# Patient Record
Sex: MALE | Race: WHITE | NOT HISPANIC OR LATINO | Employment: OTHER | URBAN - METROPOLITAN AREA
[De-identification: names, ages, dates, MRNs, and addresses within clinical notes are randomized per-mention and may not be internally consistent; named-entity substitution may affect disease eponyms.]

---

## 2017-01-02 ENCOUNTER — APPOINTMENT (OUTPATIENT)
Dept: GENERAL RADIOLOGY | Facility: CLINIC | Age: 67
End: 2017-01-02
Attending: NURSE PRACTITIONER
Payer: COMMERCIAL

## 2017-01-02 ENCOUNTER — HOSPITAL ENCOUNTER (EMERGENCY)
Facility: CLINIC | Age: 67
Discharge: HOME | End: 2017-01-02
Attending: NURSE PRACTITIONER | Admitting: NURSE PRACTITIONER
Payer: COMMERCIAL

## 2017-01-02 VITALS — OXYGEN SATURATION: 96 % | SYSTOLIC BLOOD PRESSURE: 171 MMHG | DIASTOLIC BLOOD PRESSURE: 96 MMHG | TEMPERATURE: 97 F

## 2017-01-02 DIAGNOSIS — R07.81 RIB PAIN ON LEFT SIDE: ICD-10-CM

## 2017-01-02 PROCEDURE — 99203 OFFICE O/P NEW LOW 30 MIN: CPT | Performed by: NURSE PRACTITIONER

## 2017-01-02 PROCEDURE — 71101 X-RAY EXAM UNILAT RIBS/CHEST: CPT | Mod: LT

## 2017-01-02 PROCEDURE — 99213 OFFICE O/P EST LOW 20 MIN: CPT

## 2017-01-02 RX ORDER — HYDROCODONE BITARTRATE AND ACETAMINOPHEN 5; 325 MG/1; MG/1
1-2 TABLET ORAL EVERY 4 HOURS PRN
Qty: 15 TABLET | Refills: 0 | Status: SHIPPED | OUTPATIENT
Start: 2017-01-02 | End: 2017-01-17

## 2017-01-02 RX ORDER — METHOCARBAMOL 750 MG/1
750 TABLET, FILM COATED ORAL 4 TIMES DAILY PRN
Qty: 45 TABLET | Refills: 0 | Status: SHIPPED | OUTPATIENT
Start: 2017-01-02 | End: 2017-01-17

## 2017-01-02 NOTE — ED PROVIDER NOTES
History     Chief Complaint   Patient presents with     Rib Injury     fall on ice     HPI  Charlie Duckworth is a 66 year old male who Fell on ice Saturday, carrying something. Landed on his back. Did not hit head. Left rib/back pain. Denies shortness of breath.  Pain increases with movement.     Patient Active Problem List   Diagnosis     Essential hypertension, benign     Hyperlipidemia       No current facility-administered medications on file prior to encounter.  Current Outpatient Prescriptions on File Prior to Encounter:  MULTIVITAMINS OR TABS ONEtablet twice daily [facility]   OVER-THE-COUNTER Anti oxident for eye 1 tablet twice daily       I have reviewed the Medications, Allergies, Past Medical and Surgical History, and Social History in the Epic system.    Review of Systems  As mentioned above in the history present illness. All other systems were reviewed and are negative.    Physical Exam   BP: (!) 171/96 mmHg  Heart Rate: 74  Temp: 97  F (36.1  C)  SpO2: 96 %  Physical Exam    GENERAL APPEARANCE: healthy, alert and no distress  EYES: EOMI,  PERRL, conjunctiva clear  HENT: ear canals and TM's normal.  Nose and mouth without ulcers, erythema or lesions  NECK: supple, nontender, no lymphadenopathy  RESP: lungs clear to auscultation - no rales, rhonchi or wheezes  CV: regular rates and rhythm, normal S1 S2, no murmur noted  Chest Wall: tender with palpation to the left back low ribs. No crepitus.  No ecchymosis or swelling.       ED Course   Procedures         Results for orders placed or performed during the hospital encounter of 01/02/17 (from the past 24 hour(s))   Ribs XR, unilat 3 views + PA chest,  left    Narrative    XR RIBS & CHEST LT 3VW 1/2/2017 12:46 PM    COMPARISON: None.    HISTORY: Fall on ice, left rib and back pain.      Impression    IMPRESSION: Cardiac silhouette and pulmonary vasculature are within  normal limits. No focal airspace disease, pleural effusion or  pneumothorax. No  displaced rib fractures are seen.    URBAN POWERS       Labs Ordered and Resulted from Time of ED Arrival Up to the Time of Departure from the ED - No data to display    Assessments & Plan (with Medical Decision Making)   Left back/rib pain since falling on ice 3 days ago. Exam is unremarkable. Xray negative for rib fracture, pneumothorax or pneumonia.  Likely musculoskeletal pain. Provided pain medicaiton and muscle relaxer- Norco and Robaxin sent to pharmacy. Instructed as follows:  Heat to back as needed.  Ice after doing activity.  Use ibuprofen 400-600 mg up to 4 times per day if needed for pain. Stop if it is causing nausea or abdominal pain.   Add Norco 5-325 (hydrocodone-acetaminophen) 1-2 pills up to every 4 hours if needed for pain. Do not use alcohol, operate machinery, drive, or climb on ladders for 8 hours after taking Norco. Use docusate (100mg) 2 times a day to prevent constipation while on narcotics.  Robaxyn 750mg every 4x/day as needed for spasm.    I have reviewed the nursing notes.    I have reviewed the findings, diagnosis, plan and need for follow up with the patient.    Discharge Medication List as of 1/2/2017  1:13 PM      START taking these medications    Details   HYDROcodone-acetaminophen (NORCO) 5-325 MG per tablet Take 1-2 tablets by mouth every 4 hours as needed for moderate to severe pain, Disp-15 tablet, R-0, Local Print      methocarbamol (ROBAXIN) 750 MG tablet Take 1 tablet (750 mg) by mouth 4 times daily as needed for muscle spasms, Disp-45 tablet, R-0, E-Prescribe             Final diagnoses:   Rib pain on left side       1/2/2017   [facility] EMERGENCY DEPARTMENT      Vanessa De Leon APRN CNP  01/02/17 1313    Vanessa De Leon APRN CNP  01/02/17 4475

## 2017-01-02 NOTE — ED AVS SNAPSHOT
[facility] Emergency Department    [address] [address]    Phone:  [phone]    Fax:  [phone]                                       Charlie Duckworth   MRN: [id number]    Department:  [facility] Emergency Department   Date of Visit:  1/2/2017           Patient Information     Date Of Birth          1950        Your diagnoses for this visit were:     Rib pain on left side        You were seen by Vanessa De Leon APRN CNP.      Follow-up Information     Follow up with Sacha Adari MD.    Specialty:  Family Practice    Why:  As needed    Contact information:    [facility]  [address] [address]  [phone]          Discharge Instructions       Heat to back as needed.  Ice after doing activity.  Use ibuprofen 400-600 mg up to 4 times per day if needed for pain. Stop if it is causing nausea or abdominal pain.   Add Norco 5-325 (hydrocodone-acetaminophen) 1-2 pills up to every 4 hours if needed for pain. Do not use alcohol, operate machinery, drive, or climb on ladders for 8 hours after taking Norco. Use docusate (100mg) 2 times a day to prevent constipation while on narcotics.  Robaxyn 750mg every 4x/day as needed for spasm.      Discharge References/Attachments     CHEST WALL PAIN, COSTOCHONDRITIS (ENGLISH)      24 Hour Appointment Hotline       To make an appointment at any [facility] clinic, call [phone] ([phone]). If you don't have a family doctor or clinic, we will help you find one. [facility] clinics are conveniently located to serve the needs of you and your family.             Review of your medicines      START taking        Dose / Directions Last dose taken    HYDROcodone-acetaminophen 5-325 MG per tablet   Commonly known as:  NORCO   Dose:  1-2 tablet   Quantity:  15 tablet        Take 1-2 tablets by mouth every 4 hours as needed for moderate to severe pain   Refills:  0        methocarbamol 750 MG tablet    Commonly known as:  ROBAXIN   Dose:  750 mg   Quantity:  45 tablet        Take 1 tablet (750 mg) by mouth 4 times daily as needed for muscle spasms   Refills:  0          Our records show that you are taking the medicines listed below. If these are incorrect, please call your family doctor or clinic.        Dose / Directions Last dose taken    multivitamin per tablet        ONEtablet twice daily [facility] man   Refills:  0        OVER-THE-COUNTER        Anti oxident for eye 1 tablet twice daily   Refills:  0                Prescriptions were sent or printed at these locations (2 Prescriptions)                   [facility] [place], [place] - [address]   [address] [address]    Telephone:  [phone]   Fax:  [phone]   Hours:                  E-Prescribed (1 of 2)         methocarbamol (ROBAXIN) 750 MG tablet                 Printed at Department/Unit printer (1 of 2)         HYDROcodone-acetaminophen (NORCO) 5-325 MG per tablet                Procedures and tests performed during your visit     Ribs XR, unilat 3 views + PA chest,  left      Orders Needing Specimen Collection     None      Pending Results     No orders found from 1/1/2017 to 1/3/2017.       Test Results from your hospital stay           1/2/2017 12:51 PM - Interface, Radiant Ib      Narrative     XR RIBS & CHEST LT 3VW 1/2/2017 12:46 PM    COMPARISON: None.    HISTORY: Fall on ice, left rib and back pain.        Impression     IMPRESSION: Cardiac silhouette and pulmonary vasculature are within  normal limits. No focal airspace disease, pleural effusion or  pneumothorax. No displaced rib fractures are seen.    URBAN POWERS                Thank you for choosing [facility]       Thank you for choosing [facility] for your care. Our goal is always to provide you with excellent care. Hearing back from our patients is one way we can continue to improve our services. Please take a few minutes to complete the written  "survey that you may receive in the mail after you visit with us. Thank you!        [facility] Information     [employer] lets you send messages to your doctor, view your test results, renew your prescriptions, schedule appointments and more. To sign up, go to [email or web address] . Click on \"Log in\" on the left side of the screen, which will take you to the Welcome page. Then click on \"Sign up Now\" on the right side of the page.     You will be asked to enter the access code listed below, as well as some personal information. Please follow the directions to create your username and password.     Your access code is: [email or web address]  Expires: 2017  1:13 PM     Your access code will  in 90 days. If you need help or a new code, please call your [place] clinic or [phone].        After Visit Summary       This is your record. Keep this with you and show to your community pharmacist(s) and doctor(s) at your next visit.                  "

## 2017-01-02 NOTE — DISCHARGE INSTRUCTIONS
Heat to back as needed.  Ice after doing activity.  Use ibuprofen 400-600 mg up to 4 times per day if needed for pain. Stop if it is causing nausea or abdominal pain.   Add Norco 5-325 (hydrocodone-acetaminophen) 1-2 pills up to every 4 hours if needed for pain. Do not use alcohol, operate machinery, drive, or climb on ladders for 8 hours after taking Norco. Use docusate (100mg) 2 times a day to prevent constipation while on narcotics.  Robaxyn 750mg every 4x/day as needed for spasm.

## 2017-01-02 NOTE — ED AVS SNAPSHOT
[facility] Emergency Department    [address] [address]    Phone:  [phone]    Fax:  [phone]                                       Charlie Duckworth   MRN: [id number]    Department:  [facility] Emergency Department   Date of Visit:  1/2/2017           After Visit Summary Signature Page     I have received my discharge instructions, and my questions have been answered. I have discussed any challenges I see with this plan with the nurse or doctor.    ..........................................................................................................................................  Patient/Patient Representative Signature      ..........................................................................................................................................  Patient Representative Print Name and Relationship to Patient    ..................................................               ................................................  Date                                            Time    ..........................................................................................................................................  Reviewed by Signature/Title    ...................................................              ..............................................  Date                                                            Time

## 2017-01-17 ENCOUNTER — OFFICE VISIT (OUTPATIENT)
Dept: FAMILY MEDICINE | Facility: CLINIC | Age: 67
End: 2017-01-17
Payer: COMMERCIAL

## 2017-01-17 VITALS
SYSTOLIC BLOOD PRESSURE: 143 MMHG | WEIGHT: 184.8 LBS | TEMPERATURE: 97.8 F | HEART RATE: 66 BPM | HEIGHT: 66 IN | OXYGEN SATURATION: 97 % | BODY MASS INDEX: 29.7 KG/M2 | DIASTOLIC BLOOD PRESSURE: 81 MMHG

## 2017-01-17 DIAGNOSIS — Z23 NEED FOR VACCINATION WITH 13-POLYVALENT PNEUMOCOCCAL CONJUGATE VACCINE: ICD-10-CM

## 2017-01-17 DIAGNOSIS — I10 ESSENTIAL HYPERTENSION, BENIGN: ICD-10-CM

## 2017-01-17 DIAGNOSIS — R07.81 RIB PAIN ON LEFT SIDE: Primary | ICD-10-CM

## 2017-01-17 PROCEDURE — 90471 IMMUNIZATION ADMIN: CPT | Performed by: INTERNAL MEDICINE

## 2017-01-17 PROCEDURE — 99203 OFFICE O/P NEW LOW 30 MIN: CPT | Mod: 25 | Performed by: INTERNAL MEDICINE

## 2017-01-17 PROCEDURE — 90670 PCV13 VACCINE IM: CPT | Performed by: INTERNAL MEDICINE

## 2017-01-17 NOTE — PROGRESS NOTES
"  SUBJECTIVE:                                                    Charlie Duckworth is a 66 year old male who presents to clinic today for the following health issues:      ED/UC Followup:    Facility:  [facility] ED  Date of visit: 1/2/17  Reason for visit: fall on ice, carrying large box on to the ice, injured left side ribs  Current Status: still in pain 3/10, would like another x-ray and check for blood clots.      Charlie fell on the ice on New Year's Nena and presented to the ED on 1/2 with left rib pain.  X-ray was normal.  They gave him Norco and Robaxin for pain, he took these for awhile and got better, and is not longer taking anything for pain.  He does still have a bit of pain in the left posterior rib cage and sometimes in the left lower anterior rib cage.  He wonders if a fracture may have been missed or if he may have a blood clot in the lungs since he does have siblings who have had clots.  He has not had any SOB, fevers, chills, leg swelling, or calf pain.  He did not have any periods of inactivity due to his injury.    Problem list and histories reviewed & adjusted, as indicated.  Additional history: as documented    No current outpatient prescriptions on file.     No Known Allergies    ROS:  Constitutional, MSK systems are negative, except as otherwise noted.    OBJECTIVE:                                                    /81 mmHg  Pulse 66  Temp(Src) 97.8  F (36.6  C) (Tympanic)  Ht 5' 5.75\" (1.67 m)  Wt 184 lb 12.8 oz (83.825 kg)  BMI 30.06 kg/m2  SpO2 97%  Body mass index is 30.06 kg/(m^2).  GENERAL: healthy, alert and no distress  RESP: lungs clear to auscultation - no rales, rhonchi or wheezes  CV: regular rate and rhythm, normal S1 S2, no S3 or S4, no murmur, click or rub  MS: mild tenderness to palpation in the left lower posterior ribs on palpation, no leg edema of calf pain    Diagnostic Test Results:  none      ASSESSMENT/PLAN:                                                  "       1. Rib pain on left side    He has some ongoing pain with mild tenderness on palpation of left lower posterior chest wall on exam.  A small fracture is a possibility, but bruising is much more likely.  Given that an x-ray would not , he is okay with not repeating imaging.  He does not have any other symptoms or signs for PE, and I feel this is very unlikely and testing is not necessary.  He was reassured that he lacks other symptoms- did provide him education of these symptoms to look out for in the future since he may be at higher risk given his family history.      2. Essential hypertension, benign    BP slightly above target today.  He says this is because he has been drinking more alcohol during the holidays and he expects it will come down after the Super Bowl.  He does not wish to start medication.    Follow-up as needed.      Huseyin Mccartney MD  [facility]

## 2017-01-17 NOTE — MR AVS SNAPSHOT
"              After Visit Summary   1/17/2017    Charlie Duckworth    MRN: [id number]           Patient Information     Date Of Birth          1950        Visit Information        Provider Department      1/17/2017 9:00 AM Huseyin Mccartney MD [facility]        Care Instructions    Symptoms of blood clots include shortness of breath, fast heart rate, low grade fever.  If the clot is in the legs, there is usually swelling in the lower legs and pain in the calves.  Clots tend to happen when people are not very active.  Since you are not having these symptoms, a clot is very unlikely and I don't think we need to do any testing.    The prolonged pain in your ribs likely due to some bruising on the bones.          Follow-ups after your visit        Who to contact     If you have questions or need follow up information about today's clinic visit or your schedule please contact [facility] directly at [phone].  Normal or non-critical lab and imaging results will be communicated to you by MyChart, letter or phone within 4 business days after the clinic has received the results. If you do not hear from us within 7 days, please contact the clinic through [employer] or phone. If you have a critical or abnormal lab result, we will notify you by phone as soon as possible.  Submit refill requests through [employer] or call your pharmacy and they will forward the refill request to us. Please allow 3 business days for your refill to be completed.          Additional Information About Your Visit        [employer] Information     [employer] lets you send messages to your doctor, view your test results, renew your prescriptions, schedule appointments and more. To sign up, go to [email or web address] . Click on \"Log in\" on the left side of the screen, which will take you to the Welcome page. Then click on \"Sign up Now\" on the right side of the page.     You will be asked to enter the access code listed below, as " "well as some personal information. Please follow the directions to create your username and password.     Your access code is: [email or web address]  Expires: 2017  1:13 PM     Your access code will  in 90 days. If you need help or a new code, please call your [facility] or [phone].        Care EveryWhere ID     This is your Care EveryWhere ID. This could be used by other organizations to access your [place] medical records  [id number]        Your Vitals Were     Pulse Temperature Height BMI (Body Mass Index) Pulse Oximetry       66 97.8  F (36.6  C) (Tympanic) 5' 5.75\" (1.67 m) 30.06 kg/m2 97%        Blood Pressure from Last 3 Encounters:   17 143/81   17 171/96   02/15/07 138/92    Weight from Last 3 Encounters:   17 184 lb 12.8 oz (83.825 kg)   02/15/07 189 lb 12.8 oz (86.093 kg)   07 194 lb (87.998 kg)              Today, you had the following     No orders found for display       Primary Care Provider Office Phone # Fax #    Sacha Adair [phone]       [facility] [address] [address]        Thank you!     Thank you for choosing [facility]  for your care. Our goal is always to provide you with excellent care. Hearing back from our patients is one way we can continue to improve our services. Please take a few minutes to complete the written survey that you may receive in the mail after your visit with us. Thank you!             Your Updated Medication List - Protect others around you: Learn how to safely use, store and throw away your medicines at www.disposemymeds.org.          This list is accurate as of: 17  9:33 AM.  Always use your most recent med list.                   Brand Name Dispense Instructions for use    HYDROcodone-acetaminophen 5-325 MG per tablet    NORCO    15 tablet    Take 1-2 tablets by mouth every 4 hours as needed for moderate to severe pain       methocarbamol 750 MG tablet "    ROBAXIN    45 tablet    Take 1 tablet (750 mg) by mouth 4 times daily as needed for muscle spasms       multivitamin per tablet      ONEtablet twice daily [facility]       OVER-THE-COUNTER      Anti oxident for eye 1 tablet twice daily

## 2017-01-17 NOTE — NURSING NOTE
"Chief Complaint   Patient presents with     ER F/U     seen 1/2/17, fell on ice, wants x-ray and check for blood clots        Initial /91 mmHg  Pulse 66  Temp(Src) 97.8  F (36.6  C) (Tympanic)  Ht 5' 5.75\" (1.67 m)  Wt 184 lb 12.8 oz (83.825 kg)  BMI 30.06 kg/m2  SpO2 97% Estimated body mass index is 30.06 kg/(m^2) as calculated from the following:    Height as of this encounter: 5' 5.75\" (1.67 m).    Weight as of this encounter: 184 lb 12.8 oz (83.825 kg).  BP completed using cuff size: calista MERCADO CMA ([facility])        "

## 2017-01-17 NOTE — PATIENT INSTRUCTIONS
Symptoms of blood clots include shortness of breath, fast heart rate, low grade fever.  If the clot is in the legs, there is usually swelling in the lower legs and pain in the calves.  Clots tend to happen when people are not very active.  Since you are not having these symptoms, a clot is very unlikely and I don't think we need to do any testing.    The prolonged pain in your ribs likely due to some bruising on the bones.

## 2017-06-28 ENCOUNTER — TELEPHONE (OUTPATIENT)
Dept: FAMILY MEDICINE | Facility: CLINIC | Age: 67
End: 2017-06-28

## 2017-06-28 NOTE — TELEPHONE ENCOUNTER
Called patient and left message w/ phone number for patient to return call. When patient calls back, he needs to schedule a colonoscopy and an RN visit to have his blood pressure checked. Cari MERCADO CMA ([facility])

## 2017-07-15 ENCOUNTER — HEALTH MAINTENANCE LETTER (OUTPATIENT)
Age: 67
End: 2017-07-15

## 2018-01-25 ENCOUNTER — TELEPHONE (OUTPATIENT)
Dept: FAMILY MEDICINE | Facility: CLINIC | Age: 68
End: 2018-01-25

## 2018-01-25 NOTE — TELEPHONE ENCOUNTER
1/25/2018    Call Regarding Preventive Health Screening Colonoscopy    Attempt 2    Message on voicemail     Comments: Clinic made prior attempt(s)        Outreach   Bhumika Hedrick

## 2018-02-02 NOTE — TELEPHONE ENCOUNTER
2/2/2018    Call Regarding Preventive Health Screening Colonoscopy    Attempt 3    Message on voicemail    Comments:       Outreach   Heather Chaudhry

## 2018-10-17 ENCOUNTER — TELEPHONE (OUTPATIENT)
Dept: FAMILY MEDICINE | Facility: CLINIC | Age: 68
End: 2018-10-17

## 2018-10-17 NOTE — TELEPHONE ENCOUNTER
"Reason for call:  Patient reporting a symptom    Symptom or request: cough with inspiration, body aches, fever     Duration (how long have symptoms been present): last night    Have you been treated for this before? No    Additional comments: pt calling stating he started getting sick last night. He has a cough and it's hard to take a deep breath. He said he is sore and has \"no drive\".    Phone Number patient can be reached at:  Home number on file [phone] (home)    Best Time:  any    Can we leave a detailed message on this number:  YES    Call taken on 10/17/2018 at 10:18 AM by Duyen Caraballo    "

## 2018-10-18 NOTE — TELEPHONE ENCOUNTER
S-(situation): Flu like symptoms    B-(background): Started yesterday    A-(assessment): Whole body is so sore and stiff. Slept 20 hours yesterday. No energy today. Feels a little better today than yesterday. Does state he thinks he has a fever, although hasn't taken his temp. He states he has chills. Denies chest pain, SOB, black or tarry stools, vomiting, sudden one sided weakness, new onset of irregular or slow heartbeat.     R-(recommendations): As advised by Telephone Triage Protocols for Nurses (Kieran, 2016) eat as tolerated, try toast or soups. Drink fluids. Try ice packs on forehead to reduce fever or heat for sore muscles. Take a bath. Rest.    Marcia BAUMAN RN

## 2018-10-30 ENCOUNTER — OFFICE VISIT (OUTPATIENT)
Dept: FAMILY MEDICINE | Facility: CLINIC | Age: 68
End: 2018-10-30
Payer: MEDICARE

## 2018-10-30 VITALS
SYSTOLIC BLOOD PRESSURE: 152 MMHG | OXYGEN SATURATION: 96 % | DIASTOLIC BLOOD PRESSURE: 80 MMHG | BODY MASS INDEX: 29.89 KG/M2 | HEIGHT: 66 IN | HEART RATE: 59 BPM | TEMPERATURE: 97.1 F | WEIGHT: 186 LBS

## 2018-10-30 DIAGNOSIS — Z00.00 ROUTINE HEALTH MAINTENANCE: Primary | ICD-10-CM

## 2018-10-30 DIAGNOSIS — R20.0 NUMBNESS AND TINGLING OF FOOT: ICD-10-CM

## 2018-10-30 DIAGNOSIS — Z12.5 ENCOUNTER FOR SCREENING FOR MALIGNANT NEOPLASM OF PROSTATE: ICD-10-CM

## 2018-10-30 DIAGNOSIS — R25.2 CRAMP OF LIMB: ICD-10-CM

## 2018-10-30 DIAGNOSIS — Z13.6 SCREENING FOR AAA (ABDOMINAL AORTIC ANEURYSM): ICD-10-CM

## 2018-10-30 DIAGNOSIS — R20.2 NUMBNESS AND TINGLING OF FOOT: ICD-10-CM

## 2018-10-30 DIAGNOSIS — R73.01 ELEVATED FASTING GLUCOSE: ICD-10-CM

## 2018-10-30 DIAGNOSIS — Z11.59 NEED FOR HEPATITIS C SCREENING TEST: ICD-10-CM

## 2018-10-30 DIAGNOSIS — I10 ESSENTIAL HYPERTENSION, BENIGN: ICD-10-CM

## 2018-10-30 DIAGNOSIS — Z23 NEED FOR 23-POLYVALENT PNEUMOCOCCAL POLYSACCHARIDE VACCINE: ICD-10-CM

## 2018-10-30 DIAGNOSIS — D22.9 ATYPICAL MOLE: ICD-10-CM

## 2018-10-30 LAB
ANION GAP SERPL CALCULATED.3IONS-SCNC: 6 MMOL/L (ref 3–14)
BUN SERPL-MCNC: 20 MG/DL (ref 7–30)
CALCIUM SERPL-MCNC: 8.5 MG/DL (ref 8.5–10.1)
CHLORIDE SERPL-SCNC: 105 MMOL/L (ref 94–109)
CHOLEST SERPL-MCNC: 245 MG/DL
CO2 SERPL-SCNC: 29 MMOL/L (ref 20–32)
CREAT SERPL-MCNC: 0.94 MG/DL (ref 0.66–1.25)
GFR SERPL CREATININE-BSD FRML MDRD: 80 ML/MIN/1.7M2
GLUCOSE SERPL-MCNC: 138 MG/DL (ref 70–99)
HDLC SERPL-MCNC: 50 MG/DL
LDLC SERPL CALC-MCNC: 160 MG/DL
MAGNESIUM SERPL-MCNC: 2.3 MG/DL (ref 1.6–2.3)
NONHDLC SERPL-MCNC: 195 MG/DL
POTASSIUM SERPL-SCNC: 4.2 MMOL/L (ref 3.4–5.3)
PSA SERPL-ACNC: 1.54 UG/L (ref 0–4)
SODIUM SERPL-SCNC: 140 MMOL/L (ref 133–144)
TRIGL SERPL-MCNC: 174 MG/DL
TSH SERPL DL<=0.005 MIU/L-ACNC: 2.09 MU/L (ref 0.4–4)
VIT B12 SERPL-MCNC: 406 PG/ML (ref 193–986)

## 2018-10-30 PROCEDURE — 99397 PER PM REEVAL EST PAT 65+ YR: CPT | Mod: 25 | Performed by: INTERNAL MEDICINE

## 2018-10-30 PROCEDURE — G0009 ADMIN PNEUMOCOCCAL VACCINE: HCPCS | Performed by: INTERNAL MEDICINE

## 2018-10-30 PROCEDURE — 80048 BASIC METABOLIC PNL TOTAL CA: CPT | Performed by: INTERNAL MEDICINE

## 2018-10-30 PROCEDURE — 83735 ASSAY OF MAGNESIUM: CPT | Performed by: INTERNAL MEDICINE

## 2018-10-30 PROCEDURE — 84443 ASSAY THYROID STIM HORMONE: CPT | Performed by: INTERNAL MEDICINE

## 2018-10-30 PROCEDURE — 90732 PPSV23 VACC 2 YRS+ SUBQ/IM: CPT | Performed by: INTERNAL MEDICINE

## 2018-10-30 PROCEDURE — 36415 COLL VENOUS BLD VENIPUNCTURE: CPT | Performed by: INTERNAL MEDICINE

## 2018-10-30 PROCEDURE — 80061 LIPID PANEL: CPT | Performed by: INTERNAL MEDICINE

## 2018-10-30 PROCEDURE — 82607 VITAMIN B-12: CPT | Performed by: INTERNAL MEDICINE

## 2018-10-30 PROCEDURE — G0103 PSA SCREENING: HCPCS | Performed by: INTERNAL MEDICINE

## 2018-10-30 PROCEDURE — 86803 HEPATITIS C AB TEST: CPT | Performed by: INTERNAL MEDICINE

## 2018-10-30 PROCEDURE — 99214 OFFICE O/P EST MOD 30 MIN: CPT | Mod: 25 | Performed by: INTERNAL MEDICINE

## 2018-10-30 NOTE — MR AVS SNAPSHOT
After Visit Summary   10/30/2018    Charlie Duckworth    MRN: [id number]           Patient Information     Date Of Birth          1950        Visit Information        Provider Department      10/30/2018 8:20 AM Huseyin Mccartney MD [facility]        Today's Diagnoses     Routine health maintenance    -  1    Need for hepatitis C screening test        Screening for AAA (abdominal aortic aneurysm)        Atypical mole        Numbness and tingling of foot        Cramp of limb        Encounter for screening for malignant neoplasm of prostate           Care Instructions    Call if you need a referral for a new hearing test.    The numbness and burning you describe sounds like a nerve problem, may have a pinched nerve.  We'll check some lab work.  If this is normal and symptoms worsen, we can consider a nerve conduction study.     Return in 2 weeks for a nurse blood pressure check and bring your home readings and your home cuff.     I'll try to order the ultrasound later.  I'll let you know if I get this in.  Please call [phone] to schedule.       Preventive Health Recommendations:       Male Ages 65 and over    Yearly exam:             See your health care provider every year in order to  o   Review health changes.   o   Discuss preventive care.    o   Review your medicines if your doctor has prescribed any.    Talk with your health care provider about whether you should have a test to screen for prostate cancer (PSA).    Every 3 years, have a diabetes test (fasting glucose). If you are at risk for diabetes, you should have this test more often.    Every 5 years, have a cholesterol test. Have this test more often if you are at risk for high cholesterol or heart disease.     Every 10 years, have a colonoscopy. Or, have a yearly FIT test (stool test). These exams will check for colon cancer.    Talk to with your health care provider about screening for Abdominal Aortic Aneurysm if you have a  family history of AAA or have a history of smoking.  Shots:     Get a flu shot each year.     Get a tetanus shot every 10 years.     Talk to your doctor about your pneumonia vaccines. There are now two you should receive - Pneumovax (PPSV 23) and Prevnar (PCV 13).    Talk to your pharmacist about a shingles vaccine.     Talk to your doctor about the hepatitis B vaccine.  Nutrition:     Eat at least 5 servings of fruits and vegetables each day.     Eat whole-grain bread, whole-wheat pasta and brown rice instead of white grains and rice.     Get adequate Calcium and Vitamin D.   Lifestyle    Exercise for at least 150 minutes a week (30 minutes a day, 5 days a week). This will help you control your weight and prevent disease.     Limit alcohol to one drink per day.     No smoking.     Wear sunscreen to prevent skin cancer.     See your dentist every six months for an exam and cleaning.     See your eye doctor every 1 to 2 years to screen for conditions such as glaucoma, macular degeneration and cataracts.          Follow-ups after your visit        Additional Services     DERMATOLOGY REFERRAL       Your provider has referred you to: [facility]: [facility] [phone]   [email or web address]    Please be aware that coverage of these services is subject to the terms and limitations of your health insurance plan.  Call member services at your health plan with any benefit or coverage questions.      Please bring the following with you to your appointment:    (1) Any X-Rays, CTs or MRIs which have been performed.  Contact the facility where they were done to arrange for  prior to your scheduled appointment.    (2) List of current medications  (3) This referral request   (4) Any documents/labs given to you for this referral                  Follow-up notes from your care team     Return in about 2 weeks (around 11/13/2018) for BP Recheck.      Who to contact     If you have  "questions or need follow up information about today's clinic visit or your schedule please contact [facility] directly at [phone].  Normal or non-critical lab and imaging results will be communicated to you by MyChart, letter or phone within 4 business days after the clinic has received the results. If you do not hear from us within 7 days, please contact the clinic through MyChart or phone. If you have a critical or abnormal lab result, we will notify you by phone as soon as possible.  Submit refill requests through [employer] or call your pharmacy and they will forward the refill request to us. Please allow 3 business days for your refill to be completed.          Additional Information About Your Visit        Care EveryWhere ID     This is your Care EveryWhere ID. This could be used by other organizations to access your [place] medical records  [id number]        Your Vitals Were     Pulse Temperature Height Pulse Oximetry BMI (Body Mass Index)       59 97.1  F (36.2  C) (Tympanic) 5' 6.25\" (1.683 m) 96% 29.8 kg/m2        Blood Pressure from Last 3 Encounters:   10/30/18 152/80   01/17/17 143/81   01/02/17 (!) 171/96    Weight from Last 3 Encounters:   10/30/18 186 lb (84.4 kg)   01/17/17 184 lb 12.8 oz (83.8 kg)   02/15/07 189 lb 12.8 oz (86.1 kg)              We Performed the Following     **Hepatitis C Screen Reflex to RNA FUTURE anytime     Basic metabolic panel     DERMATOLOGY REFERRAL     Lipid panel reflex to direct LDL Fasting     Magnesium     PSA, screen     TSH with free T4 reflex     Vitamin B12        Primary Care Provider Office Phone # Fax #    Sacha Adair [phone] [address] [facility] [address]        Equal Access to Services     SHERRI ELKINS : Modesta Wilkinson, tamiko ward, bipin hernandez, perfecto diego. So [facility] [phone].    ATENCIÓN: Si habla español, tiene a li disposición " servicios gratuitos de asistencia lingüística. Jeimy rhodes [phone].    We comply with applicable federal civil rights laws and [place] laws. We do not discriminate on the basis of race, color, national origin, age, disability, sex, sexual orientation, or gender identity.            Thank you!     Thank you for choosing [facility]  for your care. Our goal is always to provide you with excellent care. Hearing back from our patients is one way we can continue to improve our services. Please take a few minutes to complete the written survey that you may receive in the mail after your visit with us. Thank you!             Your Updated Medication List - Protect others around you: Learn how to safely use, store and throw away your medicines at www.disposemymeds.org.      Notice  As of 10/30/2018  9:18 AM    You have not been prescribed any medications.

## 2018-10-30 NOTE — PATIENT INSTRUCTIONS
Call if you need a referral for a new hearing test.    The numbness and burning you describe sounds like a nerve problem, may have a pinched nerve.  We'll check some lab work.  If this is normal and symptoms worsen, we can consider a nerve conduction study.     Return in 2 weeks for a nurse blood pressure check and bring your home readings and your home cuff.     I'll try to order the ultrasound later.  I'll let you know if I get this in.  Please call [phone] to schedule.       Preventive Health Recommendations:       Male Ages 65 and over    Yearly exam:             See your health care provider every year in order to  o   Review health changes.   o   Discuss preventive care.    o   Review your medicines if your doctor has prescribed any.    Talk with your health care provider about whether you should have a test to screen for prostate cancer (PSA).    Every 3 years, have a diabetes test (fasting glucose). If you are at risk for diabetes, you should have this test more often.    Every 5 years, have a cholesterol test. Have this test more often if you are at risk for high cholesterol or heart disease.     Every 10 years, have a colonoscopy. Or, have a yearly FIT test (stool test). These exams will check for colon cancer.    Talk to with your health care provider about screening for Abdominal Aortic Aneurysm if you have a family history of AAA or have a history of smoking.  Shots:     Get a flu shot each year.     Get a tetanus shot every 10 years.     Talk to your doctor about your pneumonia vaccines. There are now two you should receive - Pneumovax (PPSV 23) and Prevnar (PCV 13).    Talk to your pharmacist about a shingles vaccine.     Talk to your doctor about the hepatitis B vaccine.  Nutrition:     Eat at least 5 servings of fruits and vegetables each day.     Eat whole-grain bread, whole-wheat pasta and brown rice instead of white grains and rice.     Get adequate Calcium and Vitamin D.    Lifestyle    Exercise for at least 150 minutes a week (30 minutes a day, 5 days a week). This will help you control your weight and prevent disease.     Limit alcohol to one drink per day.     No smoking.     Wear sunscreen to prevent skin cancer.     See your dentist every six months for an exam and cleaning.     See your eye doctor every 1 to 2 years to screen for conditions such as glaucoma, macular degeneration and cataracts.

## 2018-10-30 NOTE — PROGRESS NOTES
"  SUBJECTIVE:   Charlie Duckworth is a 68 year old male who presents for Preventive Visit.    Chief Complaint   Patient presents with     Physical     Imm/Inj     flu vaccine deferred   Are you in the first 12 months of your Medicare Part B coverage?  No    Healthy Habits:    Do you get at least three servings of calcium containing foods daily (dairy, green leafy vegetables, etc.)? yes    Amount of exercise or daily activities, outside of work: 7 day(s) per week, stretch every morning, treadmill a few times per week, very active     Problems taking medications regularly not applicable    Medication side effects: No    Have you had an eye exam in the past two years? no    Do you see a dentist twice per year? yes    Do you have sleep apnea, excessive snoring or daytime drowsiness?no      Ability to successfully perform activities of daily living: Yes, no assistance needed    Home safety:  none identified     Hearing impairment: Yes, patient reports has trouble hearing all the time, has hearing aids but does not wear them as much as he should    Fall risk:  Fallen 2 or more times in the past year?: No  Any fall with injury in the past year?: No        COGNITIVE SCREEN  1) Repeat 3 items (Leader, Season, Table)    2) Clock draw: NORMAL  3) 3 item recall: Recalls 3 objects  Results: 3 items recalled: COGNITIVE IMPAIRMENT LESS LIKELY    Mini-CogTM Copyright S Katarina. Licensed by the author for use in [facility]; reprinted with permission ([email or web address]). All rights reserved.          CONCERNS:  TOE Numbness - left foot in the 3rd and 4th toes (always numb, but varies in intensity) and some very light \"burning\" sensation on right arm- since early last summer, pain is mild but always there, in a focal spot on the forearm, doesn't go to fingers.  Does have a lot of hand cramping.  No elbow pain.  Does get numb shoulders at night.  No neck or back pain.     Reviewed and updated as needed this visit by clinical " staff  Tobacco  Allergies  Meds  Med Hx  Surg Hx  Fam Hx  Soc Hx        Reviewed and updated as needed this visit by Provider        Social History   Substance Use Topics     Smoking status: Former Smoker     Quit date: 1/31/1972     Smokeless tobacco: Never Used     Alcohol use Yes      Comment: red wine daily       If you drink alcohol do you typically have >3 drinks per day or >7 drinks per week? Yes - AUDIT SCORE:     No flowsheet data found.                        Today's PHQ-2 Score:   PHQ-2 ( 1999 Pfizer) 10/30/2018   Q1: Little interest or pleasure in doing things 0   Q2: Feeling down, depressed or hopeless 1   PHQ-2 Score 1       Do you feel safe in your environment - Yes    Do you have a Health Care Directive?: Yes: Patient states has Advance Directive and will bring in a copy to clinic.    Current providers sharing in care for this patient include:   Patient Care Team:  Sacha Adair MD as PCP - General    The following health maintenance items are reviewed in Epic and correct as of today:  Health Maintenance   Topic Date Due     PHQ-2 Q1 YR  09/29/1963     HEPATITIS C SCREENING  09/29/1969     ADVANCE DIRECTIVE PLANNING Q5 YRS  09/29/2006     PSA Q1 YR  02/01/2008     LIPID MONITORING Q1 YEAR  05/02/2008     AORTIC ANEURYSM SCREENING (SYSTEM ASSIGNED)  09/29/2016     FALL RISK ASSESSMENT  01/17/2018     PNEUMOCOCCAL (2 of 2 - PPSV23) 01/17/2018     INFLUENZA VACCINE (1) 09/01/2018     TETANUS Q10 YR  11/27/2022     COLONOSCOPY Q10 YR  01/01/2024     Patient Active Problem List   Diagnosis     Essential hypertension, benign     Hyperlipidemia     Past Surgical History:   Procedure Laterality Date     SURGICAL HISTORY OF -       rhinoplasty, septoplasty       Social History   Substance Use Topics     Smoking status: Former Smoker     Quit date: 1/31/1972     Smokeless tobacco: Never Used     Alcohol use Yes      Comment: red wine daily     Family History   Problem Relation Age of Onset      "Hypertension Mother      Diabetes Father      Blood Disease Father      leukemia     Alcohol/Drug Father      Alcohol/Drug Maternal Grandmother      Alcohol/Drug Maternal Grandfather      Diabetes Paternal Grandmother      Alcohol/Drug Paternal Grandmother      Alcohol/Drug Paternal Grandfather      Alcohol/Drug Brother      Alcohol/Drug Sister      C.A.D. Son      congential heart  birth defect     Alcohol/Drug Sister      Diabetes Sister          No current outpatient prescriptions on file.     No Known Allergies    Pneumonia Vaccine: due 23 valent    ROS:  Constitutional, HEENT, cardiovascular, pulmonary, GI, , musculoskeletal, neuro, skin, endocrine and psych systems are negative, except as otherwise noted plus a mole on the left upper chest that has been changing plus flu symptoms a couple weeks ago    OBJECTIVE:   /80 (BP Location: Left arm, Patient Position: Chair, Cuff Size: Adult Regular)  Pulse 59  Temp 97.1  F (36.2  C) (Tympanic)  Ht 5' 6.25\" (1.683 m)  Wt 186 lb (84.4 kg)  SpO2 96%  BMI 29.8 kg/m2 Estimated body mass index is 29.8 kg/(m^2) as calculated from the following:    Height as of this encounter: 5' 6.25\" (1.683 m).    Weight as of this encounter: 186 lb (84.4 kg).  EXAM:   GENERAL: healthy, alert and no distress  EYES: Eyes grossly normal to inspection, PERRL and conjunctivae and sclerae normal  HENT: ear canals and TM's normal, nose and mouth without ulcers or lesions  NECK: no adenopathy, no asymmetry, masses, or scars and thyroid normal to palpation  RESP: lungs clear to auscultation - no rales, rhonchi or wheezes  CV: regular rate and rhythm, normal S1 S2, no S3 or S4, no murmur, click or rub, no peripheral edema and peripheral pulses strong  ABDOMEN: soft, nontender, no hepatosplenomegaly, no masses and bowel sounds normal  MS: no gross musculoskeletal defects noted, no edema  SKIN: small but elongated and irregularly colored mole on upper left chest  NEURO: mentation intact " and reports reduced light touch sensation on the L 3rd and 4th toes  PSYCH: mentation appears normal, affect normal/bright    Diagnostic Test Results:  No results found for this or any previous visit (from the past 24 hour(s)).    ASSESSMENT / PLAN:   1. Routine health maintenance      Immunizations: PPSV23 vax given.  Declined flu vaccine    Lab Studies: Due glucose, cholesterol, Hep C.  Discussed risks/benefits of PSA screening and he wished to proceed    Colonoscopy/FIT: due next year    Advanced care planning: has at home, will send in copy       - **Hepatitis C Screen Reflex to RNA FUTURE anytime  - PSA, screen  - Lipid panel reflex to direct LDL Fasting    2. Need for hepatitis C screening test    - **Hepatitis C Screen Reflex to RNA FUTURE anytime    3. Screening for AAA (abdominal aortic aneurysm)    Recommended but computer indicated insurance would not pay, so he declined at this time.     4. Atypical mole    Irregular mole on left upper chest.  Recommend full skin exam with derm.     - DERMATOLOGY REFERRAL    5. Numbness and tingling of foot    Burning in right forearm and numb L 3rd and 4th toes seems likely due to a focal neuropathy in both locations, but will check for systemic disease.  Discussed further testing with EMG but he says symptoms are not bothersome enough at this point    - Glucose  - TSH with free T4 reflex  - Vitamin B12    6. Cramp of limb    Cramps of hands and thighs on occasion.  Likely idiopathic, but will check some labs.     - TSH with free T4 reflex  - Magnesium  - Basic metabolic panel    7. Encounter for screening for malignant neoplasm of prostate     - PSA, screen      8. Essential HTN    Not currently on meds.  He reports home BPs averaging 132/80.  Will have him return in 2 weeks for RN BP check.       ADDENDUM:  Fasting glucose elevated.  A1C added on.  Cholesterol elevated, will recommend starting statin, especially if diabetes confirmed.     The 10-year ASCVD risk score  "(Octavio ESCOBAR Jr, et al., 2013) is: 21.8%    Values used to calculate the score:      Age: 68 years      Sex: Male      Is Non- : No      Diabetic: No      Tobacco smoker: No      Systolic Blood Pressure: 152 mmHg      Is BP treated: No      HDL Cholesterol: 50 mg/dL      Total Cholesterol: 245 mg/dL      End of Life Planning:  Patient currently has an advanced directive: Yes.  Practitioner is supportive of decision.    COUNSELING:  Reviewed preventive health counseling, as reflected in patient instructions       Consider AAA screening for ages 65-75 and smoking history       Immunizations    Vaccinated for: Pneumococcal and Declined: flu due to Other - didn't feel he needed it despite education            BP Readings from Last 1 Encounters:   10/30/18 150/80     Estimated body mass index is 29.8 kg/(m^2) as calculated from the following:    Height as of this encounter: 5' 6.25\" (1.683 m).    Weight as of this encounter: 186 lb (84.4 kg).      Weight management plan: Discussed healthy diet and exercise guidelines and patient will follow up in 12 months in clinic to re-evaluate.     reports that he quit smoking about 46 years ago. He has never used smokeless tobacco.      Appropriate preventive services were discussed with this patient, including applicable screening as appropriate for cardiovascular disease, diabetes, osteopenia/osteoporosis, and glaucoma.  As appropriate for age/gender, discussed screening for colorectal cancer, prostate cancer, breast cancer, and cervical cancer. Checklist reviewing preventive services available has been given to the patient.    Reviewed patients plan of care and provided an AVS. The Basic Care Plan (routine screening as documented in Health Maintenance) for Charlie meets the Care Plan requirement. This Care Plan has been established and reviewed with the Patient.    Counseling Resources:  ATP IV Guidelines  Pooled Cohorts Equation Calculator  Breast Cancer Risk " Calculator  FRAX Risk Assessment  ICSI Preventive Guidelines  Dietary Guidelines for Americans, 2010  USDA's MyPlate  ASA Prophylaxis  Lung CA Screening    Huseyin Mccartney MD  [facility]

## 2018-10-31 DIAGNOSIS — R73.01 ELEVATED FASTING GLUCOSE: ICD-10-CM

## 2018-10-31 LAB
HBA1C MFR BLD: 5.8 % (ref 0–5.6)
HCV AB SERPL QL IA: NONREACTIVE

## 2018-10-31 PROCEDURE — 36415 COLL VENOUS BLD VENIPUNCTURE: CPT | Performed by: INTERNAL MEDICINE

## 2018-10-31 PROCEDURE — 83036 HEMOGLOBIN GLYCOSYLATED A1C: CPT | Performed by: INTERNAL MEDICINE

## 2019-01-24 ENCOUNTER — TELEPHONE (OUTPATIENT)
Dept: FAMILY MEDICINE | Facility: CLINIC | Age: 69
End: 2019-01-24

## 2019-01-24 NOTE — TELEPHONE ENCOUNTER
Panel Management Review      Patient has the following on his problem list:   Patient Active Problem List   Diagnosis     Essential hypertension, benign     Hyperlipidemia       Composite cancer screening  Chart review shows that this patient is due/due soon for the following   Health Maintenance   Topic Date Due     ADVANCE DIRECTIVE PLANNING Q5 YRS  09/29/2006     ZOSTER IMMUNIZATION (2 of 3) 01/26/2015     AORTIC ANEURYSM SCREENING (SYSTEM ASSIGNED)  09/29/2016     INFLUENZA VACCINE (1) 09/01/2018     COLONOSCOPY Q5 YR  01/01/2019     FALL RISK ASSESSMENT  10/30/2019     LIPID MONITORING Q1 YEAR  10/30/2019     PSA Q1 YR  10/30/2019     PHQ-2 Q1 YR  10/30/2019     DTAP/TDAP/TD IMMUNIZATION (3 - Td) 11/27/2022     HEPATITIS C SCREENING  Completed     IPV IMMUNIZATION  Aged Out     MENINGITIS IMMUNIZATION  Aged Out       Summary:    Patient is due/failing the following:   BP Readings from Last 6 Encounters:   10/30/18 152/80   01/17/17 143/81   01/02/17 (!) 171/96   02/15/07 138/92   01/31/07 160/102      RN blood pressure recheck     Action needed:   Patient needs nurse only appointment.    Type of outreach:    Sent letter.    Questions for provider review:    None                                                                                                                                    Carey Yoo MA     Chart routed to none .

## 2021-05-17 ENCOUNTER — ANCILLARY PROCEDURE (OUTPATIENT)
Dept: GENERAL RADIOLOGY | Facility: CLINIC | Age: 71
End: 2021-05-17
Attending: FAMILY MEDICINE
Payer: COMMERCIAL

## 2021-05-17 ENCOUNTER — OFFICE VISIT (OUTPATIENT)
Dept: ORTHOPEDICS | Facility: CLINIC | Age: 71
End: 2021-05-17
Payer: COMMERCIAL

## 2021-05-17 VITALS — DIASTOLIC BLOOD PRESSURE: 70 MMHG | SYSTOLIC BLOOD PRESSURE: 142 MMHG

## 2021-05-17 DIAGNOSIS — M54.50 LOW BACK PAIN: ICD-10-CM

## 2021-05-17 DIAGNOSIS — M54.50 ACUTE LEFT-SIDED LOW BACK PAIN, UNSPECIFIED WHETHER SCIATICA PRESENT: Primary | ICD-10-CM

## 2021-05-17 PROCEDURE — 99203 OFFICE O/P NEW LOW 30 MIN: CPT | Performed by: FAMILY MEDICINE

## 2021-05-17 PROCEDURE — 72100 X-RAY EXAM L-S SPINE 2/3 VWS: CPT | Performed by: RADIOLOGY

## 2021-05-17 NOTE — PROGRESS NOTES
ASSESSMENT & PLAN  Charlie was seen today for pain.    Diagnoses and all orders for this visit:    Acute left-sided low back pain, unspecified whether sciatica present  -     XR Lumbar Spine 2-3 Views*; Future      Patient is a 70 year old male presenting for evaluation of left low back pain. Likely aggravated nerve in setting of some underlying spinal degeneration. Given improving on own, will continue with conservative treatment and monitor for improvement.    Physical Therapy HEP, if not improved can refer for formal PT. Recommended yoga strap  Injection can be considered in future  Medications Limited NSAIDs/Tylenol    Concerning signs/sx that would warrant urgent evaluation were discussed.  All questions were answered, patient understands and agrees with plan.      Can follow up in future if symptoms persist as needed.  -----    SUBJECTIVE  Charlie Duckworth is a/an 70 year old male who is seen as a self referral for evaluation of low back pain. The patient is seen by themselves.    Onset: 3 week(s) ago. Reports insidious onset during sex.  Location of Pain: left sided low back   Rating of Pain at worst: 10/10  Rating of Pain Currently: 3/10  Worsened by: twisting   Better with: stretching, standing   Treatments tried: stretching   Quality: dull, sharp, jolting   Red flags: Weakness: No, bowel/bladder loss: No, foot drop: No  Associated symptoms: no distal numbness or tingling; denies swelling or warmth  Orthopedic history: YES - Date: Similar symptoms in  2011   Relevant surgical history: NO  Social: Administrative work.   Past Medical History:   Diagnosis Date     Pure hypercholesterolemia      Type II or unspecified type diabetes mellitus without mention of complication, not stated as uncontrolled      Social History     Socioeconomic History     Marital status:      Spouse name: Not on file     Number of children: Not on file     Years of education: Not on file     Highest education level: Not on file    Occupational History     Not on file   Social Needs     Financial resource strain: Not on file     Food insecurity     Worry: Not on file     Inability: Not on file     Transportation needs     Medical: Not on file     Non-medical: Not on file   Tobacco Use     Smoking status: Former Smoker     Quit date: 1972     Years since quittin.3     Smokeless tobacco: Never Used   Substance and Sexual Activity     Alcohol use: Yes     Comment: red wine daily     Drug use: No     Sexual activity: Yes     Partners: Female   Lifestyle     Physical activity     Days per week: Not on file     Minutes per session: Not on file     Stress: Not on file   Relationships     Social connections     Talks on phone: Not on file     Gets together: Not on file     Attends Buddhist service: Not on file     Active member of club or organization: Not on file     Attends meetings of clubs or organizations: Not on file     Relationship status: Not on file     Intimate partner violence     Fear of current or ex partner: Not on file     Emotionally abused: Not on file     Physically abused: Not on file     Forced sexual activity: Not on file   Other Topics Concern     Parent/sibling w/ CABG, MI or angioplasty before 65F 55M? Not Asked   Social History Narrative     Not on file     No family history pertinent to the patient's problem today    Patient's past medical, surgical, social, and family histories were reviewed today and no changes are noted.    REVIEW OF SYSTEMS:  10 point ROS is negative other than symptoms noted above in HPI, Past Medical History or as stated below  Constitutional: NEGATIVE for fever, chills, change in weight  Skin: NEGATIVE for worrisome rashes, moles or lesions  GI/: NEGATIVE for bowel or bladder changes  Neuro: NEGATIVE for weakness, dizziness or paresthesias    OBJECTIVE:  BP (!) 142/70    General: healthy, alert and in no distress  HEENT: no scleral icterus or conjunctival erythema  Skin: no suspicious  lesions or rash. No jaundice.  CV: no pedal edema  Resp: normal respiratory effort without conversational dyspnea   Psych: normal mood and affect  Gait: normal steady gait with appropriate coordination and balance  Neuro: normal light touch sensory exam of the bilateral lower extremities.  DTR's 2+ patella and achilles bilaterally.  MSK:  THORACIC/LUMBAR SPINE  Inspection:    No gross deformity/asymmetry  Palpation:    Tender about the left lumbar paraspinals. Otherwise remainder of landmarks are nontender.  Range of Motion:     Lumbar flexion show full range    Lumbar extension show full range    Full bilateral side bending and twisting. Painful with right twisting and left side bending    Tight hamstrings  Strength:    Full strength throughout hips/quads/hamstrings  Special Tests:    Positive: None    Negative: straight leg raise (bilateral)    Independent visualization of the below image:  Recent Results (from the past 24 hour(s))   XR Lumbar Spine 2-3 Views*    Narrative    XR LUMBAR SPINE TWO-THREE VIEWS   5/17/2021 2:59 PM     HISTORY: Low back pain    COMPARISON: None.      Impression    IMPRESSION: Mild leftward lateral curvature is present. Posterior  alignment is normal. Vertebral body heights are maintained. Moderate  multilevel degenerative disc and facet disease is present.    JESUS ABDULLAHI MD     Ordered, visualized and reviewed these images with the patient  DDD and facet arthropathy noted in the lower lumbar segments     Emil Petersen MD  PGY-3, PM&R    Bryan Abreu MD

## 2021-05-17 NOTE — PATIENT INSTRUCTIONS
# Acute on chronic left low back pain: Notable over the past 3 weeks during physical activity.  Pain over the left paralumbar region with no radicular symptoms today.  X-rays showing arthritis in lower back flare this likely cause of his pain.  Given symptoms are improving we will treat as below  Image Findings: Degenerative changes in the lower lumbar regions, mild facet arthropathy  Treatment: Activities as tolerated, home exercises  Medications/Injections: Limited tylenol/ibuprofen for pain for 1-2 weeks, defer injection can consider if pain worsens will need MRI  Follow-up: In one month if symptoms do not improve, sooner if worsening\    Please call [phone]   Ask for my team if you have any questions or concerns    It was great seeing you today!    Bryan Abreu MD, CAQSM    Yoga Strap  3 sets of 30 sec on each side twice daily

## 2021-05-17 NOTE — LETTER
5/17/2021         RE: Charlie Duckworth  [address] [address] [facility] [address]        Dear Colleague,    Thank you for referring your patient, Charlie Duckworth, to the [facility] [place]. Please see a copy of my visit note below.    ASSESSMENT & PLAN  Charlie was seen today for pain.    Diagnoses and all orders for this visit:    Acute left-sided low back pain, unspecified whether sciatica present  -     XR Lumbar Spine 2-3 Views*; Future      Patient is a 70 year old male presenting for evaluation of left low back pain. Likely aggravated nerve in setting of some underlying spinal degeneration. Given improving on own, will continue with conservative treatment and monitor for improvement.    Physical Therapy HEP, if not improved can refer for formal PT. Recommended yoga strap  Injection can be considered in future  Medications Limited NSAIDs/Tylenol    Concerning signs/sx that would warrant urgent evaluation were discussed.  All questions were answered, patient understands and agrees with plan.      Can follow up in future if symptoms persist as needed.  -----    SUBJECTIVE  Charlie Duckworth is a/an 70 year old male who is seen as a self referral for evaluation of low back pain. The patient is seen by themselves.    Onset: 3 week(s) ago. Reports insidious onset during sex.  Location of Pain: left sided low back   Rating of Pain at worst: 10/10  Rating of Pain Currently: 3/10  Worsened by: twisting   Better with: stretching, standing   Treatments tried: stretching   Quality: dull, sharp, jolting   Red flags: Weakness: No, bowel/bladder loss: No, foot drop: No  Associated symptoms: no distal numbness or tingling; denies swelling or warmth  Orthopedic history: YES - Date: Similar symptoms in  2011   Relevant surgical history: NO  Social: Administrative work.   Past Medical History:   Diagnosis Date     Pure hypercholesterolemia      Type II or unspecified type diabetes mellitus without mention  of complication, not stated as uncontrolled      Social History     Socioeconomic History     Marital status:      Spouse name: Not on file     Number of children: Not on file     Years of education: Not on file     Highest education level: Not on file   Occupational History     Not on file   Social Needs     Financial resource strain: Not on file     Food insecurity     Worry: Not on file     Inability: Not on file     Transportation needs     Medical: Not on file     Non-medical: Not on file   Tobacco Use     Smoking status: Former Smoker     Quit date: 1972     Years since quittin.3     Smokeless tobacco: Never Used   Substance and Sexual Activity     Alcohol use: Yes     Comment: red wine daily     Drug use: No     Sexual activity: Yes     Partners: Female   Lifestyle     Physical activity     Days per week: Not on file     Minutes per session: Not on file     Stress: Not on file   Relationships     Social connections     Talks on phone: Not on file     Gets together: Not on file     Attends Islam service: Not on file     Active member of club or organization: Not on file     Attends meetings of clubs or organizations: Not on file     Relationship status: Not on file     Intimate partner violence     Fear of current or ex partner: Not on file     Emotionally abused: Not on file     Physically abused: Not on file     Forced sexual activity: Not on file   Other Topics Concern     Parent/sibling w/ CABG, MI or angioplasty before 65F 55M? Not Asked   Social History Narrative     Not on file     No family history pertinent to the patient's problem today    Patient's past medical, surgical, social, and family histories were reviewed today and no changes are noted.    REVIEW OF SYSTEMS:  10 point ROS is negative other than symptoms noted above in HPI, Past Medical History or as stated below  Constitutional: NEGATIVE for fever, chills, change in weight  Skin: NEGATIVE for worrisome rashes, moles or  lesions  GI/: NEGATIVE for bowel or bladder changes  Neuro: NEGATIVE for weakness, dizziness or paresthesias    OBJECTIVE:  BP (!) 142/70    General: healthy, alert and in no distress  HEENT: no scleral icterus or conjunctival erythema  Skin: no suspicious lesions or rash. No jaundice.  CV: no pedal edema  Resp: normal respiratory effort without conversational dyspnea   Psych: normal mood and affect  Gait: normal steady gait with appropriate coordination and balance  Neuro: normal light touch sensory exam of the bilateral lower extremities.  DTR's 2+ patella and achilles bilaterally.  MSK:  THORACIC/LUMBAR SPINE  Inspection:    No gross deformity/asymmetry  Palpation:    Tender about the left lumbar paraspinals. Otherwise remainder of landmarks are nontender.  Range of Motion:     Lumbar flexion show full range    Lumbar extension show full range    Full bilateral side bending and twisting. Painful with right twisting and left side bending    Tight hamstrings  Strength:    Full strength throughout hips/quads/hamstrings  Special Tests:    Positive: None    Negative: straight leg raise (bilateral)    Independent visualization of the below image:  Recent Results (from the past 24 hour(s))   XR Lumbar Spine 2-3 Views*    Narrative    XR LUMBAR SPINE TWO-THREE VIEWS   5/17/2021 2:59 PM     HISTORY: Low back pain    COMPARISON: None.      Impression    IMPRESSION: Mild leftward lateral curvature is present. Posterior  alignment is normal. Vertebral body heights are maintained. Moderate  multilevel degenerative disc and facet disease is present.    JESUS ABDULLAHI MD     Ordered, visualized and reviewed these images with the patient  DDD and facet arthropathy noted in the lower lumbar segments     Emil Petersen MD  PGY-3, PM&R    Bryan Abreu MD        Again, thank you for allowing me to participate in the care of your patient.        Sincerely,        Bryan Abreu MD

## 2021-05-19 ENCOUNTER — TELEPHONE (OUTPATIENT)
Dept: ORTHOPEDICS | Facility: CLINIC | Age: 71
End: 2021-05-19

## 2021-05-19 DIAGNOSIS — M54.50 ACUTE LEFT-SIDED LOW BACK PAIN, UNSPECIFIED WHETHER SCIATICA PRESENT: Primary | ICD-10-CM

## 2021-05-19 NOTE — TELEPHONE ENCOUNTER
Patient LVM that there is information he forgot to tell Dr. Abreu and would like to discuss this.   No other information given    Call back     Sandra Swanson MS ATC

## 2021-05-20 NOTE — TELEPHONE ENCOUNTER
Called and spoke with patient.  He states that he is also having pain over his achilles.  He is wondering if this is related to his low back pain due to altering his walk.  Patient is now interested in doing physical therapy.  I explained that I would pass this message on to Dr. Abreu for his recommendations.  He expressed understanding.    Jesenia Land ATC

## 2021-05-20 NOTE — TELEPHONE ENCOUNTER
LVM for patient stating that PT has been ordered and that their scheduling team will be reaching out to set up an appointment.    Jesenia Land, ATC

## 2021-06-04 ENCOUNTER — HOSPITAL ENCOUNTER (OUTPATIENT)
Dept: PHYSICAL THERAPY | Facility: CLINIC | Age: 71
Setting detail: THERAPIES SERIES
End: 2021-06-04
Attending: FAMILY MEDICINE
Payer: COMMERCIAL

## 2021-06-04 DIAGNOSIS — M54.50 ACUTE LEFT-SIDED LOW BACK PAIN, UNSPECIFIED WHETHER SCIATICA PRESENT: ICD-10-CM

## 2021-06-04 PROCEDURE — 97161 PT EVAL LOW COMPLEX 20 MIN: CPT | Mod: GP | Performed by: PHYSICAL THERAPIST

## 2021-06-04 PROCEDURE — 97110 THERAPEUTIC EXERCISES: CPT | Mod: GP | Performed by: PHYSICAL THERAPIST

## 2021-06-04 NOTE — PROGRESS NOTES
[facility]          OUTPATIENT PHYSICAL THERAPY ORTHOPEDIC EVALUATION  PLAN OF TREATMENT FOR OUTPATIENT REHABILITATION  (COMPLETE FOR INITIAL CLAIMS ONLY)  Patient's Last Name, First Name, M.I.  YOB: 1950  GucciCharlie ANDERSEN    Provider s Name:  [facility]   Medical Record No.  [id number]   Start of Care Date:  06/04/21   Onset Date:  (P) 05/01/21   Type:     _X__PT   ___OT   ___SLP Medical Diagnosis:  (P) LBP     PT Diagnosis:  (P) LBP   Visits from SOC:  1      _________________________________________________________________________________  Plan of Treatment/Functional Goals:  (P) manual therapy, strengthening, stretching     Goals  Goal Identifier: (P) 1.  Goal Description: (P) Pt will be independent and consistent w/HEP to control symptoms  Target Date: (P) 07/04/21          Therapy Frequency:  (P) other (see comments)  Predicted Duration of Therapy Intervention:  (P) 1 time visit planned chart will be open X 30 days in case symptoms flare then pt would come in for 2nd visit    Starla Ugalde, PT                 I CERTIFY THE NEED FOR THESE SERVICES FURNISHED UNDER        THIS PLAN OF TREATMENT AND WHILE UNDER MY CARE     (Physician co-signature of this document indicates review and certification of the therapy plan).                       Certification Date From:  (P) 06/04/21   Certification Date To:  (P) 07/04/21    Referring Provider:  Bryan Abreu MD    Initial Assessment        See Epic Evaluation Start of Care Date: 06/04/21

## 2021-06-04 NOTE — PROGRESS NOTES
06/04/21 1300   General Information   Type of Visit Initial OP Ortho PT Evaluation   Start of Care Date 06/04/21   Referring Physician Bryan Abreu MD   Patient/Family Goals Statement To learn what ex to do   Orders Evaluate and Treat   Date of Order 05/20/21   Certification Required? Yes   Medical Diagnosis LBP    Body Part(s)   Body Part(s) Lumbar Spine/SI   Presentation and Etiology   Pertinent history of current problem (include personal factors and/or comorbidities that impact the POC) Pt states about 1 month ago woke up w/ L LBP and had difficulty getting out of bed .  Pt notes he had to get on a flight that day.  Pt was really sore 1st > 2nd week (was able to start stretching 2nd week).  Pt states as of 6/1/21 he started to feel much better and do his regular stretches.  No  pain in past couple days.  No leg symptoms.  Neg bowel/ bladder changes; LE weakness.  neg cough/ sneeze .No numbness/ tingling.   Xray in chart: deg changes.  Meds: none recently.  PMHX:  intermittent LBP, last episode 2011.  L knee scope.  Low complexity   Onset date of current episode/exacerbation 05/01/21   Pain/symptoms exacerbated by   (nothing bothering)   Pain/symptoms eased by   (stretching)   Progression of symptoms since onset: Improved   Prior Level of Function   Functional Level Prior Comment stretches and ab routine full body every morning.  fishing/hunting   Current Level of Function   Patient role/employment history A. Employed   Employment Comments Owns construction company   Fall Risk Screen   Fall screen completed by PT   Have you fallen 2 or more times in the past year? No   Have you fallen and had an injury in the past year? No   Is patient a fall risk? No   Abuse Screen (yes response referral indicated)   Feels Unsafe at Home or Work/School no   Feels Threatened by Someone no   Does Anyone Try to Keep You From Having Contact with Others or Doing Things Outside Your Home? no   Lumbar Spine/SI Objective  Findings   Observation diaphysis recti   Posture Decreased lumbar lordosis.  PSIS / crest level   Gait/Locomotion WNL.  Heel/ toe walk negative   Flexion ROM WFL   Extension ROM 30% no complaints   Right Side Bending ROM 90%   Left Side Bending ROM 90%   Pelvic Screen Neg FB test   Hip Screen PROM ER R 43*, L 40*;  IR 28*.  KENY/ FADIR/ scour neg B   Hip Flexion (L2) Strength B 5/5   Hip Abduction Strength B 5/5   Knee Flexion Strength B 5/5   Knee Extension (L3) Strength B 5/5   Ankle Dorsiflexion (L4) Strength B 5/5   Great Toe Extension (L5) Strength B 5/5   Hamstring Flexibility mod tightness   Hip Flexor Flexibility WNL   SLR neg   Bry Test neg   Crossover SLR neg   Segmental Mobility ERSR L4/ L5.  Hypomobile PA's lumbar spine--notes irritation @ L5.   Palpation slight tenderness L PSIS , otherwise negative   Planned Therapy Interventions   Planned Therapy Interventions manual therapy;strengthening;stretching   Clinical Impression   Criteria for Skilled Therapeutic Interventions Met yes, treatment indicated   PT Diagnosis LBP   Influenced by the following impairments decreased strength, decreased flexibility ( pain has resolved)   Functional limitations due to impairments No limitations currently   Clinical Presentation Stable/Uncomplicated   Clinical Presentation Rationale Pt reports pain has resolved   Clinical Decision Making (Complexity) Low complexity   Therapy Frequency other (see comments)   Predicted Duration of Therapy Intervention (days/wks) 1 time visit planned chart will be open X 30 days in case symptoms flare then pt would come in for 2nd visit   Risk & Benefits of therapy have been explained Yes   Patient, Family & other staff in agreement with plan of care Yes   Education Assessment   Preferred Learning Style Listening;Demonstration;Pictures/video   Barriers to Learning No barriers   Ortho Goal 1   Goal Identifier 1.   Goal Description Pt will be independent and consistent w/HEP to control  symptoms   Target Date 07/04/21   Total Evaluation Time   PT Eval, Low Complexity Minutes (83538) 25   Therapy Certification   Certification date from 06/04/21   Certification date to 07/04/21   Medical Diagnosis LBP     Thank you for this referral,    Starla Ugalde, PT,   #[id number]  [facility]  [phone]

## 2021-07-14 NOTE — PROGRESS NOTES
Discharge Note -Physical Therapy    NAME:  Charlie Duckworth  MRN:   [id number]    S:    Pt was seen for 1 visit for HEP as he was doing well at time of initial evaluation.    O:  See initial evaluation for objective data.     A:   Pt demonstrated good understanding of ex     P:  Discharge from PT this date.    Thank you for this referral,    Starla Ugalde, PT,   #[id number]  [facility]  [phone]

## 2022-01-14 ENCOUNTER — VIRTUAL VISIT (OUTPATIENT)
Dept: FAMILY MEDICINE | Facility: CLINIC | Age: 72
End: 2022-01-14
Payer: COMMERCIAL

## 2022-01-14 DIAGNOSIS — N52.8 OTHER MALE ERECTILE DYSFUNCTION: Primary | ICD-10-CM

## 2022-01-14 PROCEDURE — 99203 OFFICE O/P NEW LOW 30 MIN: CPT | Mod: 95 | Performed by: FAMILY MEDICINE

## 2022-01-14 RX ORDER — SILDENAFIL 50 MG/1
50 TABLET, FILM COATED ORAL DAILY PRN
Qty: 10 TABLET | Refills: 0 | Status: SHIPPED | OUTPATIENT
Start: 2022-01-14 | End: 2022-03-31

## 2022-01-14 ASSESSMENT — ENCOUNTER SYMPTOMS
NERVOUS/ANXIOUS: 0
SORE THROAT: 0
ARTHRALGIAS: 0
DIZZINESS: 0
HEADACHES: 0
WEAKNESS: 0
SHORTNESS OF BREATH: 0
MYALGIAS: 0
COUGH: 0
FEVER: 0
PALPITATIONS: 0
CHILLS: 0
JOINT SWELLING: 0
PARESTHESIAS: 0

## 2022-01-14 NOTE — PROGRESS NOTES
Charlie is a 71 year old who is being evaluated via a billable telephone visit.      What phone number would you like to be contacted at? [phone]  How would you like to obtain your AVS? Mail a copy    1. Other male erectile dysfunction  - sildenafil (VIAGRA) 50 MG tablet; Take 1 tablet (50 mg) by mouth daily as needed (erectile dysfunction)  Dispense: 10 tablet; Refill: 0        Patient Active Problem List   Diagnosis     Essential hypertension, benign     Hyperlipidemia     No current outpatient medications on file.     No current facility-administered medications for this visit.         Subjective   Charlie is a 71 year old who presents for the following health issues     HPI     Symptoms of ED on and off for a couple years     1. Erectile Dysfunction:  Patient states of that he has a hard time maintaining an erection.  Urinary urgency.  States of early ejaculation.  Intermittent.  Consistent over the past 2 years.  Non-smoker. Has not tried any medications.     Review of Systems   Constitutional: Negative for chills and fever.   HENT: Negative for congestion, ear pain, hearing loss and sore throat.    Respiratory: Negative for cough and shortness of breath.    Cardiovascular: Negative for chest pain, palpitations and peripheral edema.   Musculoskeletal: Negative for arthralgias, joint swelling and myalgias.   Skin: Negative for rash.   Neurological: Negative for dizziness, weakness, headaches and paresthesias.   Psychiatric/Behavioral: Negative for mood changes. The patient is not nervous/anxious.             Objective           Vitals:  No vitals were obtained today due to virtual visit.    Physical Exam   healthy, alert and no distress  PSYCH: Alert and oriented times 3; coherent speech, normal   rate and volume, able to articulate logical thoughts, able   to abstract reason, no tangential thoughts, no hallucinations   or delusions  His affect is normal  RESP: No cough, no audible wheezing, able to talk in  full sentences  Remainder of exam unable to be completed due to telephone visits    Please abstract the following data from this visit with this patient into the appropriate field in Epic:    Tests that can be patient reported without a hard copy:    Colonoscopy done on this date: 1/1/2019 (approximately), by this group: [facility], results were normal (repeat 5 years).           Phone call duration: 15 minutes

## 2022-01-14 NOTE — PATIENT INSTRUCTIONS
Leon Guerra,    Thank you for allowing [facility] to manage your care.    I sent your prescriptions to your pharmacy.    If you have any questions or concerns, please feel free to call us at [phone].    Sincerely,    Dr. Calles    Did you know?      You can schedule a video visit for follow-up appointments as well as future appointments for certain conditions.  Please see the below link.     [email or web address]    If you have not already done so,  I encourage you to sign up for [employer] ([email or web address]).  This will allow you to review your results, securely communicate with a provider, and schedule virtual visits as well.

## 2022-03-31 DIAGNOSIS — N52.8 OTHER MALE ERECTILE DYSFUNCTION: ICD-10-CM

## 2022-03-31 RX ORDER — SILDENAFIL 50 MG/1
50 TABLET, FILM COATED ORAL DAILY PRN
Qty: 10 TABLET | Refills: 0 | Status: SHIPPED | OUTPATIENT
Start: 2022-03-31 | End: 2022-07-08

## 2022-03-31 NOTE — TELEPHONE ENCOUNTER
Pt calling wanting a refill on medications.      sildenafil (VIAGRA) 50 MG tablet    [facility]    [phone] call patient when rx is sent    Nellie Adames-  Herman Arboleda

## 2022-03-31 NOTE — TELEPHONE ENCOUNTER
Prescription approved per [facility] Refill Protocol.    Deangelo Reardon, RN, BSN, PHN  [facility]

## 2022-07-01 DIAGNOSIS — N52.8 OTHER MALE ERECTILE DYSFUNCTION: ICD-10-CM

## 2022-07-01 NOTE — TELEPHONE ENCOUNTER
Pt calling for a refill on medications    sildenafil (VIAGRA) 50 MG tablet    Pt is wondering if there is a stronger dose if possible?     [facility]    [phone] if any questions    Nellie Adames-  Herman Arboleda

## 2022-07-01 NOTE — TELEPHONE ENCOUNTER
Unfortunately, I do not recommend a higher dosage until patient is seen in clinic to establish care so we can check his vitals.

## 2022-07-01 NOTE — TELEPHONE ENCOUNTER
Called [phone] (home)     Did patient answer the phone: No, left a message on voicemail to return call to the [facility] at [phone].    Luz RN,BSN  Triage Nurse  [facility]: [facility]

## 2022-07-06 NOTE — TELEPHONE ENCOUNTER
Patient needs appointment to discuss medication and dosing please call and schedule.  Alethea Corona RN  [facility]

## 2022-07-08 ENCOUNTER — TELEPHONE (OUTPATIENT)
Dept: FAMILY MEDICINE | Facility: CLINIC | Age: 72
End: 2022-07-08

## 2022-07-08 RX ORDER — SILDENAFIL 50 MG/1
50 TABLET, FILM COATED ORAL DAILY PRN
Qty: 10 TABLET | Refills: 0 | Status: SHIPPED | OUTPATIENT
Start: 2022-07-08 | End: 2022-09-06

## 2022-07-08 NOTE — TELEPHONE ENCOUNTER
"Dr. Calles,    Patient called back stated his BP running 125-132/ 75-77. Stated he could take a picture of his memory and \"text it\" to us.     Per pt in [facility] quite a few hours away and wont be back until closer to end of summer. Asking if he can have martha fills until he is back in town and can schedule appt. Son picks up meds from [employer] and mails to him.      [phone], ok for detailed vm    Thanks,  [facility]  [facility]       "

## 2022-09-06 DIAGNOSIS — N52.8 OTHER MALE ERECTILE DYSFUNCTION: ICD-10-CM

## 2022-09-06 RX ORDER — SILDENAFIL 50 MG/1
50 TABLET, FILM COATED ORAL DAILY PRN
Qty: 20 TABLET | Refills: 3 | Status: SHIPPED | OUTPATIENT
Start: 2022-09-06

## 2022-09-06 NOTE — TELEPHONE ENCOUNTER
Patient called and stated that he needs a refill on his Viagra.    Patient would like more than # 10.    Routed to PCP to advise.  Luz RN,BSN  Triage Nurse  [facility]  Ph: [phone]

## 2022-09-08 NOTE — TELEPHONE ENCOUNTER
RN received call from patient.    Patient wondering about viagra refill.    RN advised script for 20 tablets had been sent to [facility] on 9/6.    Patient verbalized understanding.    Deangelo Reardon RN, BSN, PHN  [facility]

## 2023-04-20 ENCOUNTER — PATIENT OUTREACH (OUTPATIENT)
Dept: CARE COORDINATION | Facility: CLINIC | Age: 73
End: 2023-04-20
Payer: COMMERCIAL

## 2023-07-11 DIAGNOSIS — N52.8 OTHER MALE ERECTILE DYSFUNCTION: ICD-10-CM

## 2023-07-11 RX ORDER — SILDENAFIL 50 MG/1
50 TABLET, FILM COATED ORAL DAILY PRN
Qty: 20 TABLET | Refills: 3 | OUTPATIENT
Start: 2023-07-11

## 2023-07-11 NOTE — TELEPHONE ENCOUNTER
I have not seen the patient over 1 year and based on is chart, he established care with a new PCP.  Patient needs to contact new PCP for refills.

## 2023-07-11 NOTE — TELEPHONE ENCOUNTER
Called pt and let him know that he will need to contact new PCP for refill for medication. Pt verbalized understanding, and will call new PCP.     Kathleen Calvillo MA on 7/11/2023 at 5:26 PM

## 2023-07-18 ENCOUNTER — TRANSFERRED RECORDS (OUTPATIENT)
Dept: HEALTH INFORMATION MANAGEMENT | Facility: CLINIC | Age: 73
End: 2023-07-18

## 2023-08-01 ENCOUNTER — TELEPHONE (OUTPATIENT)
Dept: FAMILY MEDICINE | Facility: CLINIC | Age: 73
End: 2023-08-01
Payer: COMMERCIAL

## 2023-08-01 NOTE — TELEPHONE ENCOUNTER
Patient Quality Outreach    Patient is due for the following:   Colon Cancer Screening  IVD  -  LDL (Fasting)  Physical Preventive Adult Physical      Topic Date Due    Zoster (Shingles) Vaccine (2 of 3) 01/26/2015    COVID-19 Vaccine (3 - Moderna series) 06/06/2021    Diptheria Tetanus Pertussis (DTAP/TDAP/TD) Vaccine (2 - Td or Tdap) 11/27/2022       Next Steps:   Schedule a Annual Wellness Visit    Type of outreach:    Sent letter.      Questions for provider review:    None         Kathleen Calvillo MA

## 2023-08-01 NOTE — LETTER
August 1, 2023    To  Charlie Duckworth  [address] [facility] [address]    Your team at [facility] cares about your health. We have reviewed your chart and based on our findings; we are making the following recommendations to better manage your health.     You are in particular need of attention regarding the following:     Call or MyChart message your clinic to schedule a colonoscopy, schedule/ a FIT Test, or order a Cologuard test. If you are unsure what type of test you need, please call your clinic and speak to clinic staff.   Colon cancer is now the second leading cause of cancer-related deaths in the [place] [facility] for both men and women and there are over 130,000 new cases and 50,000 deaths per year from colon cancer. Colonoscopies can prevent 90-95% of these deaths. Problem lesions can be removed before they ever become cancer. This test is not only looking for cancer, but also getting rid of precancerous lesions.   If you are under/uninsured, we recommend you contact the [employer] is a free colorectal cancer screening program that provides colonoscopies for eligible under/uninsured [place] men and women. If you are interested in receiving a free colonoscopy, please call [employer] at t [phone] (mention code [employer]) to see if you're eligible. Please have them send us the results.   PREVENTATIVE VISIT: Annual Medicare Wellness:Schedule an Annual Medicare Wellness Exam. Please call your [facility] clinic to set up your appointment. Fasting labs- lipids    If you have already completed these items, please contact the clinic via phone or   [employer] so your care team can review and update your records. Thank you for   choosing [facility] for your healthcare needs. For any questions,   concerns, or to schedule an appointment please contact our clinic.    Healthy Regards,      Your [facility] Care Team

## 2024-07-09 ENCOUNTER — HOSPITAL ENCOUNTER (EMERGENCY)
Facility: CLINIC | Age: 74
Discharge: HOME | End: 2024-07-09
Attending: FAMILY MEDICINE | Admitting: FAMILY MEDICINE
Payer: COMMERCIAL

## 2024-07-09 VITALS
BODY MASS INDEX: 29.8 KG/M2 | RESPIRATION RATE: 18 BRPM | OXYGEN SATURATION: 93 % | WEIGHT: 186 LBS | TEMPERATURE: 97.8 F | HEART RATE: 77 BPM | SYSTOLIC BLOOD PRESSURE: 142 MMHG | DIASTOLIC BLOOD PRESSURE: 76 MMHG

## 2024-07-09 DIAGNOSIS — Z91.030 BEE STING ALLERGY: ICD-10-CM

## 2024-07-09 PROCEDURE — 250N000012 HC RX MED GY IP 250 OP 636 PS 637: Performed by: FAMILY MEDICINE

## 2024-07-09 PROCEDURE — 99284 EMERGENCY DEPT VISIT MOD MDM: CPT | Mod: 25 | Performed by: FAMILY MEDICINE

## 2024-07-09 PROCEDURE — 250N000009 HC RX 250: Performed by: FAMILY MEDICINE

## 2024-07-09 PROCEDURE — 99284 EMERGENCY DEPT VISIT MOD MDM: CPT | Performed by: FAMILY MEDICINE

## 2024-07-09 RX ORDER — EPINEPHRINE 0.3 MG/.3ML
0.3 INJECTION SUBCUTANEOUS
Qty: 1 EACH | Refills: 0 | Status: SHIPPED | OUTPATIENT
Start: 2024-07-09

## 2024-07-09 RX ORDER — PREDNISONE 20 MG/1
40 TABLET ORAL ONCE
Status: COMPLETED | OUTPATIENT
Start: 2024-07-09 | End: 2024-07-09

## 2024-07-09 RX ADMIN — EPINEPHRINE 0.3 MG: 1 INJECTION INTRAMUSCULAR; INTRAVENOUS; SUBCUTANEOUS at 11:40

## 2024-07-09 RX ADMIN — PREDNISONE 40 MG: 20 TABLET ORAL at 11:40

## 2024-07-09 ASSESSMENT — ACTIVITIES OF DAILY LIVING (ADL): ADLS_ACUITY_SCORE: 35

## 2024-07-09 ASSESSMENT — COLUMBIA-SUICIDE SEVERITY RATING SCALE - C-SSRS
1. IN THE PAST MONTH, HAVE YOU WISHED YOU WERE DEAD OR WISHED YOU COULD GO TO SLEEP AND NOT WAKE UP?: NO
2. HAVE YOU ACTUALLY HAD ANY THOUGHTS OF KILLING YOURSELF IN THE PAST MONTH?: NO
6. HAVE YOU EVER DONE ANYTHING, STARTED TO DO ANYTHING, OR PREPARED TO DO ANYTHING TO END YOUR LIFE?: NO

## 2024-07-09 NOTE — ED TRIAGE NOTES
"Pt stung 30 minutes ago, pt \"dora\" saw bee sting him because there were a lot of bees around. Pt has hives on upper body and states lips feel weird. Pt is speaking in full sentences and in no acute distress.        "

## 2024-07-09 NOTE — DISCHARGE INSTRUCTIONS
RETURN TO THE EMERGENCY ROOM FOR THE FOLLOWING:    With worsened swelling of the mouth, new trouble with breathing, fainting, or at anytime for any concern.    FOLLOW UP:    Consider follow-up with the allergy clinic only as needed.    TREATMENT RECOMMENDATIONS:    Epinephrine as needed for recurrent bee sting and associated symptoms of mouth swelling, trouble breathing, fainting, etc.    NURSE ADVICE LINE:  [phone] or [phone]

## 2024-07-09 NOTE — ED TRIAGE NOTES
Triage Assessment (Adult)       Row Name 07/09/24 1114          Triage Assessment    Airway WDL WDL        Respiratory WDL    Respiratory WDL WDL        Skin Circulation/Temperature WDL    Skin Circulation/Temperature WDL X  hives        Cardiac WDL    Cardiac WDL WDL        Peripheral/Neurovascular WDL    Peripheral Neurovascular WDL WDL        Cognitive/Neuro/Behavioral WDL    Cognitive/Neuro/Behavioral WDL WDL

## 2024-07-09 NOTE — ED PROVIDER NOTES
"  HPI   Patient is a 73-year-old male presenting with bee sting and associated reaction.  He has not had a bee sting reaction previously.  Per my chart review, the patient has known history of hyperlipidemia and hypertension.  He takes lisinopril 5 mg daily.  He has a history of type 2 diabetes.    About 1 hour ago, the patient was stung in the back by \"at least one bee but maybe more.\"  Shortly after, he began to have diffuse itching on his skin and a raised red rash.  Then, he began to experience some numbness and swelling sensation about his mouth and may be inside the mouth or back of the throat/tongue.  He denies throat swelling.  No difficulty with breathing, chest tightness, or wheezing.  No nausea or vomiting.  No diarrhea.  He does report lightheadedness when getting out of the truck and walking in.  No fainting.      Allergies:  No Known Allergies  Problem List:    Patient Active Problem List    Diagnosis Date Noted    Essential hypertension, benign 02/15/2007     Priority: Medium    Hyperlipidemia 02/15/2007     Priority: Medium     Problem list name updated by automated process. Provider to review        Past Medical History:    Past Medical History:   Diagnosis Date    Pure hypercholesterolemia     Type II or unspecified type diabetes mellitus without mention of complication, not stated as uncontrolled      Past Surgical History:    Past Surgical History:   Procedure Laterality Date    SURGICAL HISTORY OF -       rhinoplasty, septoplasty     Family History:    Family History   Problem Relation Age of Onset    Hypertension Mother     Diabetes Father     Blood Disease Father         leukemia    Alcohol/Drug Father     Alcohol/Drug Maternal Grandmother     Alcohol/Drug Maternal Grandfather     Diabetes Paternal Grandmother     Alcohol/Drug Paternal Grandmother     Alcohol/Drug Paternal Grandfather     Alcohol/Drug Brother     Alcohol/Drug Sister     C.A.D. Son         congential heart  birth defect    " Alcohol/Drug Sister     Diabetes Sister      Social History:  Marital Status:   [2]  Social History     Tobacco Use    Smoking status: Former     Current packs/day: 0.00     Types: Cigarettes     Quit date: 1972     Years since quittin.4    Smokeless tobacco: Never   Substance Use Topics    Alcohol use: Yes     Comment: red wine daily    Drug use: No      Medications:    EPINEPHrine (ANY BX GENERIC EQUIV) 0.3 MG/0.3ML injection 2-pack  sildenafil (VIAGRA) 50 MG tablet      Review of Systems   All other systems reviewed and are negative.      PE   Pulse: 79  Temp: 97.8  F (36.6  C)  Resp: 18  Weight: 84.4 kg (186 lb)  SpO2: 97 %  Physical Exam  Vitals and nursing note reviewed.   Constitutional:       General: He is not in acute distress.  HENT:      Head: Atraumatic.      Right Ear: External ear normal.      Left Ear: External ear normal.      Nose: Nose normal.      Mouth/Throat:      Mouth: Mucous membranes are moist.      Pharynx: Oropharynx is clear.   Eyes:      General: No scleral icterus.     Extraocular Movements: Extraocular movements intact.      Conjunctiva/sclera: Conjunctivae normal.      Pupils: Pupils are equal, round, and reactive to light.   Cardiovascular:      Rate and Rhythm: Normal rate.   Pulmonary:      Effort: Pulmonary effort is normal. No respiratory distress.   Musculoskeletal:         General: Normal range of motion.      Cervical back: Normal range of motion.   Skin:     General: Skin is warm and dry.      Comments: Red, raised rash that is diffuse.  This is consistent with hives.   Neurological:      Mental Status: He is alert and oriented to person, place, and time.   Psychiatric:         Behavior: Behavior normal.         ED COURSE and MDM   1132.  Patient with hives, mild symptoms, and lightheadedness after bee sting.  Epinephrine 0.3 mg IM ordered.  He took Benadryl prior to arrival.  Prednisone 40 mg oral dose.    1205.  Patient improved with medication given.  He  would like to leave, I think this is reasonable.  Swelling and numbness about the mouth is resolved.  Pruritus/rash nearly resolved.    Electronic medical chart reviewed, including medical problems, medications, medical allergies, social history.  Recent hospitalizations and surgical procedures reviewed.  Recent clinic visits and consultations reviewed.  Recent labs and test results reviewed.  Nursing notes reviewed.    The patient, their parent if applicable, and/or their medical decision maker(s) and I have reviewed all of the available historical information, applicable PMH, physical exam findings, and objective diagnostic data gathered during this ED visit.  We then discussed all work-up options and then together agreed upon the course taken during this visit.  The ultimate disposition and plan was a cooperative decision made between myself and the patient, their parent if applicable, and/or their legal decision maker(s).  The risks and benefits of all decisions made during this visit were discussed to the best of my abilities given the circumstances, and all parties are understanding of the pertinent ramifications of these decisions.      LABS  Labs Ordered and Resulted from Time of ED Arrival to Time of ED Departure - No data to display    IMAGING  Images reviewed by me.  Radiology report also reviewed.  No orders to display       Procedures    Medications   EPINEPHrine (ADRENALIN) kit 0.3 mg (0.3 mg Intramuscular $Given 7/9/24 1140)   predniSONE (DELTASONE) tablet 40 mg (40 mg Oral $Given 7/9/24 1140)         IMPRESSION       ICD-10-CM    1. Bee sting allergy  Z91.030                Medication List        Started      EPINEPHrine 0.3 MG/0.3ML injection 2-pack  Commonly known as: ANY BX GENERIC EQUIV  0.3 mg, Intramuscular, ONCE PRN                                  Zechariah Deluca MD  07/09/24 9156